# Patient Record
Sex: MALE | Race: WHITE | Employment: FULL TIME | URBAN - METROPOLITAN AREA
[De-identification: names, ages, dates, MRNs, and addresses within clinical notes are randomized per-mention and may not be internally consistent; named-entity substitution may affect disease eponyms.]

---

## 2018-12-31 ENCOUNTER — OFFICE VISIT (OUTPATIENT)
Dept: DERMATOLOGY | Facility: CLINIC | Age: 23
End: 2018-12-31
Payer: COMMERCIAL

## 2018-12-31 VITALS — SYSTOLIC BLOOD PRESSURE: 120 MMHG | OXYGEN SATURATION: 98 % | HEART RATE: 79 BPM | DIASTOLIC BLOOD PRESSURE: 81 MMHG

## 2018-12-31 DIAGNOSIS — L30.9 DERMATITIS: ICD-10-CM

## 2018-12-31 DIAGNOSIS — L29.9 LOCALIZED PRURITUS: ICD-10-CM

## 2018-12-31 DIAGNOSIS — L21.9 DERMATITIS, SEBORRHEIC: Primary | ICD-10-CM

## 2018-12-31 DIAGNOSIS — L81.0 POST-INFLAMMATORY HYPERPIGMENTATION: ICD-10-CM

## 2018-12-31 PROCEDURE — 99203 OFFICE O/P NEW LOW 30 MIN: CPT | Performed by: PHYSICIAN ASSISTANT

## 2018-12-31 RX ORDER — KETOCONAZOLE 20 MG/ML
SHAMPOO TOPICAL
Qty: 120 ML | Refills: 11 | Status: SHIPPED | OUTPATIENT
Start: 2018-12-31 | End: 2019-09-18

## 2018-12-31 RX ORDER — THYROID 30 MG/1
30 TABLET ORAL DAILY
COMMUNITY
Start: 2018-08-10 | End: 2019-08-10

## 2018-12-31 NOTE — PROGRESS NOTES
HPI:  Larry Self is a 23 year old male patient here today for rash on face and chest .  Patient states this has been present for a while.  Patient reports the following symptoms: itch and red .  Patient reports the following previous treatments: elidel and HC cream once a day as needed. Also has a circular rash on left hip area/abdomen treating with ketoconazole daily x 6 months with some improvement..  Patient reports the following modifying factors: none.  Associated symptoms: flaking of scalp.  Patient has no other skin complaints today.  Remainder of the HPI, Meds, PMH, Allergies, FH, and SH was reviewed in chart.      History reviewed. No pertinent past medical history.    History reviewed. No pertinent surgical history.     History reviewed. No pertinent family history.    Social History     Socioeconomic History     Marital status: Single     Spouse name: Not on file     Number of children: Not on file     Years of education: Not on file     Highest education level: Not on file   Social Needs     Financial resource strain: Not on file     Food insecurity - worry: Not on file     Food insecurity - inability: Not on file     Transportation needs - medical: Not on file     Transportation needs - non-medical: Not on file   Occupational History     Not on file   Tobacco Use     Smoking status: Never Smoker     Smokeless tobacco: Never Used   Substance and Sexual Activity     Alcohol use: Not on file     Drug use: Not on file     Sexual activity: Not on file   Other Topics Concern     Not on file   Social History Narrative     Not on file       Outpatient Encounter Medications as of 12/31/2018   Medication Sig Dispense Refill     ketoconazole (NIZORAL) 2 % external shampoo Wet affected area daily, apply shampoo and lather, let sit for 3-5 minutes and then rinse. 120 mL 11     thyroid (ARMOUR THYROID) 30 MG tablet Take 30 mg by mouth daily       No facility-administered encounter medications on file as of 12/31/2018.         Review Of Systems:  Skin: As above  Eyes: negative  Ears/Nose/Throat: negative  Respiratory: No shortness of breath, dyspnea on exertion, cough, or hemoptysis  Cardiovascular: negative  Gastrointestinal: negative  Genitourinary: negative  Musculoskeletal: negative  Neurologic: negative  Psychiatric: negative  Hematologic/Lymphatic/Immunologic: negative  Endocrine: negative      Objective:     /81   Pulse 79   SpO2 98%   Eyes: Conjunctivae/lids: Normal   ENT: Lips:  Normal  MSK: Normal  Cardiovascular: Peripheral edema none  Pulm: Breathing Normal  Neuro/Psych: Orientation: Normal; Mood/Affect: Normal, NAD, WDWN  Pt accompanied by: self  Following areas examined: face, scalp, chest, abdomen, hands  Barakat skin type:ii   Findings:  Moderate flaking of scalp. Yellow/light pink greasy patches on chest and nlf  Wd Light brown/pink smooth patch on left inferior abdomen  Assessment and Plan:  1) Seborrheic Dermatitis and localized pruritis  Chronic condition that cannot be cured, but it can be managed.     Scalp: Wash scalp daily with Nizoral x 2-4 weeks. Then begin rotating between prescription shampoo Nizoral and one over the counter shampoo  (examples: Head and shoulders, Selsen Blue, Ketoconazole, T-Sal, and/or T-gel.     Face and chest: wash daily with nizoral shampoo x 2-4 weeks. Apply ketoconazole topical x 2-4 weeks and either elidel or hydrocortisone 2x a day for 1-2 weeks. Tapering with improvement.    Side effects of topical steroids including but not limited to atrophy (skin thinning), striae (stretch marks) telangiectasias, steroid acne, and others. Do not apply to normal skin. Do not apply to discolored skin that does not have rash present.     2) PIH  nummular dermatitis vs psoriasis?  Apply elidel 2x a day for 2 weeks when flared. Tapering with improvement. If no improvement would recommend trying a stronger medication. Pt will call if he would like a stronger rx.     Follow up in 4-6  weeks.

## 2018-12-31 NOTE — PATIENT INSTRUCTIONS
Seborrheic Dermatitis  Chronic condition that cannot be cured, but it can be managed.     Scalp: Wash scalp daily with Nizoral x 2-4 weeks. Then begin rotating between prescription shampoo Nizoral and one over the counter shampoo  (examples: Head and shoulders, Selsen Blue, Ketoconazole, T-Sal, and/or T-gel.     Face and chest: wash daily with nizoral shampoo x 2-4 weeks. Apply ketoconazole topical x 2-4 weeks and either elidel or hydrocortisone 2x a day for 1-2 weeks. Tapering with improvement.     Dermatitis on left hip: apply elidel 2x a day for 2 weeks. Tapering with improvement. If no improvement would recommend trying a stronger medication.     Side effects of topical steroids including but not limited to atrophy (skin thinning), striae (stretch marks) telangiectasias, steroid acne, and others. Do not apply to normal skin. Do not apply to discolored skin that does not have rash present.     Follow up in 4-6 weeks.                    Proper skin care from [facility]:    -Eliminate harsh soaps as they strip the natural oils from the skin, often resulting in dry itchy skin ( i.e. Dial, Zest, [place] Spring)  -Use mild soaps such as Cetaphil or Dove Sensitive Skin in the shower. You do not need to use soap on arms, legs, and trunk every time you shower unless visibly soiled.   -Avoid hot or cold showers.  -After showering, lightly dry off and apply moisturizing within 2-3 minutes. This will help trap moisture in the skin.   -Aggressive use of a moisturizer at least 1-2 times a day to the entire body (including -Vanicream, Cetaphil, Aquaphor or Cerave) and moisturize hands after every washing.  -We recommend using moisturizers that come in a tub that needs to be scooped out, not a pump. This has more of an oil base. It will hold moisture in your skin much better than a water base moisturizer. The above recommended are non-pore clogging.    Wear a sunscreen with at least SPF 30 on your face, ears, neck and V  of the chest daily. Wear sunscreen on other areas of the body if those areas are exposed to the sun throughout the day. Sunscreens can contain physical and/or chemical blockers. Physical blockers are less likely to clog pores, these include zinc oxide and titanium dioxide. Reapply every two hour and after swimming. Sunscreen examples include Neutrogena, CeraVe, Blue Lizard, Elta MD and many others.    UV radiation  UVA radiation remains constant throughout the day and throughout the year. It is a longer wavelength than UVB and therefore penetrates deeper into the skin leading to immediate and delayed tanning, photoaging, and skin cancer. 70-80% of UVA and UVB radiation occurs between the hours of 10am-2pm.  UVB radiation  UVB radiation causes the most harmful effects and is more significant during the summer months. However, snow and ice can reflect UVB radiation leading to skin damage during the winter months as well. UVB radiation is responsible for tanning, burning, inflammation, delayed erythema (pinkness), pigmentation (brown spots), and skin cancer.   Just because you do not burn or are not developing a tan does not mean that you are not damaging your skin. A 15 minute drive to and from work for 30 years an lead to chronic sun damage of the skin. It is important to wear a broad spectrum (both UVA and UVB) sunscreen EVERY day with at least 30 SPF. Apply to face, ears, neck and v of the chest as this is where most of our sun exposure is. Reapply sunscreen every two hours if you plan on being outside.   Senthil Mcneal. Clinical Dermatology: A Color Guide to Diagnosis and Therapy. [employer], 2016.     .

## 2019-09-18 ENCOUNTER — OFFICE VISIT (OUTPATIENT)
Dept: DERMATOLOGY | Facility: CLINIC | Age: 24
End: 2019-09-18
Payer: COMMERCIAL

## 2019-09-18 VITALS — SYSTOLIC BLOOD PRESSURE: 125 MMHG | OXYGEN SATURATION: 99 % | DIASTOLIC BLOOD PRESSURE: 81 MMHG | HEART RATE: 60 BPM

## 2019-09-18 DIAGNOSIS — L21.9 DERMATITIS, SEBORRHEIC: Primary | ICD-10-CM

## 2019-09-18 PROCEDURE — 99213 OFFICE O/P EST LOW 20 MIN: CPT | Performed by: PHYSICIAN ASSISTANT

## 2019-09-18 RX ORDER — KETOCONAZOLE 20 MG/ML
SHAMPOO TOPICAL
Qty: 120 ML | Refills: 11 | Status: SHIPPED | OUTPATIENT
Start: 2019-09-18 | End: 2021-08-30

## 2019-09-18 RX ORDER — CICLOPIROX OLAMINE 7.7 MG/G
CREAM TOPICAL
Qty: 30 G | Refills: 11 | Status: SHIPPED | OUTPATIENT
Start: 2019-09-18 | End: 2021-08-30

## 2019-09-18 NOTE — PROGRESS NOTES
HPI:  Larry Self is a 24 year old male patient here today for follow up seb derm of scalp, face, and chest .  Patient states this has been present for a while on and off.  Patient reports the following symptoms: rash .  Patient reports the following previous treatments: ketoconazole shampoo, topical, and HC cream with great improvement.  Patient reports the following modifying factors: none.  Associated symptoms: none.  Patient has no other skin complaints today.  Remainder of the HPI, Meds, PMH, Allergies, FH, and SH was reviewed in chart.      History reviewed. No pertinent past medical history.    History reviewed. No pertinent surgical history.     History reviewed. No pertinent family history.    Social History     Socioeconomic History     Marital status: Single     Spouse name: Not on file     Number of children: Not on file     Years of education: Not on file     Highest education level: Not on file   Occupational History     Not on file   Social Needs     Financial resource strain: Not on file     Food insecurity:     Worry: Not on file     Inability: Not on file     Transportation needs:     Medical: Not on file     Non-medical: Not on file   Tobacco Use     Smoking status: Never Smoker     Smokeless tobacco: Never Used   Substance and Sexual Activity     Alcohol use: Not on file     Drug use: Not on file     Sexual activity: Not on file   Lifestyle     Physical activity:     Days per week: Not on file     Minutes per session: Not on file     Stress: Not on file   Relationships     Social connections:     Talks on phone: Not on file     Gets together: Not on file     Attends Jainism service: Not on file     Active member of club or organization: Not on file     Attends meetings of clubs or organizations: Not on file     Relationship status: Not on file     Intimate partner violence:     Fear of current or ex partner: Not on file     Emotionally abused: Not on file     Physically abused: Not on file      Forced sexual activity: Not on file   Other Topics Concern     Not on file   Social History Narrative     Not on file       Outpatient Encounter Medications as of 9/18/2019   Medication Sig Dispense Refill     ciclopirox (LOPROX) 0.77 % cream Apply to affected area on face BID x 4-6 weeks 30 g 11     ketoconazole (NIZORAL) 2 % external shampoo Wet affected area daily, apply shampoo and lather, let sit for 3-5 minutes and then rinse. 120 mL 11     thyroid (ARMOUR THYROID) 30 MG tablet Take 30 mg by mouth daily       [DISCONTINUED] ketoconazole (NIZORAL) 2 % external shampoo Wet affected area daily, apply shampoo and lather, let sit for 3-5 minutes and then rinse. 120 mL 11     No facility-administered encounter medications on file as of 9/18/2019.        Review Of Systems:  Skin: As above  Eyes: negative  Ears/Nose/Throat: negative  Respiratory: No shortness of breath, dyspnea on exertion, cough, or hemoptysis  Cardiovascular: negative  Gastrointestinal: negative  Genitourinary: negative  Musculoskeletal: negative  Neurologic: negative  Psychiatric: negative  Hematologic/Lymphatic/Immunologic: negative  Endocrine: negative      Objective:     /81   Pulse 60   SpO2 99%   Eyes: Conjunctivae/lids: Normal   ENT: Lips:  Normal  MSK: Normal  Cardiovascular: Peripheral edema none  Pulm: Breathing Normal  Neuro/Psych: Orientation: Normal; Mood/Affect: Normal, NAD, WDWN  Pt accompanied by: self  Following areas examined: face, neck, chest  Barakat skin type:ii   Findings:  Pink/organe scaly patch chest, mustache and eyebrow area  Assessment and Plan:  1) Seborrheic Dermatitis  Disc orals and topicals. Pt defers orals. Will call soon if he changes mind.   States he clears up with he uses rx.   Chronic condition that cannot be cured, but it can be managed.   Scalp  Nizoral : Wet affected area daily, apply shampoo and lather, let sit for 3-5 minutes and then rinse.     Face and chest    Nizoral: Wash Wet affected  area daily, apply shampoo and lather, let sit for 3-5 minutes and then rinse.   Apply ciclopirox to affected area on face 2x a day for 4-6 weeks. Tapering with improvement.   Follow up in yearly. Sooner if rash does not improve.

## 2019-09-18 NOTE — PATIENT INSTRUCTIONS
Proper skin care from [facility]:    -Eliminate harsh soaps as they strip the natural oils from the skin, often resulting in dry itchy skin ( i.e. Dial, Zest, [place] Spring)  -Use mild soaps such as Cetaphil or Dove Sensitive Skin in the shower. You do not need to use soap on arms, legs, and trunk every time you shower unless visibly soiled.   -Avoid hot or cold showers.  -After showering, lightly dry off and apply moisturizing within 2-3 minutes. This will help trap moisture in the skin.   -Aggressive use of a moisturizer at least 1-2 times a day to the entire body (including -Vanicream, Cetaphil, Aquaphor or Cerave) and moisturize hands after every washing.  -We recommend using moisturizers that come in a tub that needs to be scooped out, not a pump. This has more of an oil base. It will hold moisture in your skin much better than a water base moisturizer. The above recommended are non-pore clogging.      Wear a sunscreen with at least SPF 30 on your face, ears, neck and V of the chest daily. Wear sunscreen on other areas of the body if those areas are exposed to the sun throughout the day. Sunscreens can contain physical and/or chemical blockers. Physical blockers are less likely to clog pores, these include zinc oxide and titanium dioxide. Reapply every two hour and after swimming. Sunscreen examples include Neutrogena, CeraVe, Blue Lizard, Elta MD and many others.    UV radiation  UVA radiation remains constant throughout the day and throughout the year. It is a longer wavelength than UVB and therefore penetrates deeper into the skin leading to immediate and delayed tanning, photoaging, and skin cancer. 70-80% of UVA and UVB radiation occurs between the hours of 10am-2pm.  UVB radiation  UVB radiation causes the most harmful effects and is more significant during the summer months. However, snow and ice can reflect UVB radiation leading to skin damage during the winter months as well. UVB radiation is  responsible for tanning, burning, inflammation, delayed erythema (pinkness), pigmentation (brown spots), and skin cancer.     Seborrheic Dermatitis  Chronic condition that cannot be cured, but it can be managed.   Scalp  Nizoral : Wet affected area daily, apply shampoo and lather, let sit for 3-5 minutes and then rinse.     Face and chest    Nizoral: Wash Wet affected area daily, apply shampoo and lather, let sit for 3-5 minutes and then rinse.   Apply ciclopirox to affected area on face 2x a day for 4-6 weeks. Tapering with improvement.         Follow up in 4-6 weeks.

## 2021-04-25 ENCOUNTER — HEALTH MAINTENANCE LETTER (OUTPATIENT)
Age: 26
End: 2021-04-25

## 2021-08-30 ENCOUNTER — OFFICE VISIT (OUTPATIENT)
Dept: DERMATOLOGY | Facility: CLINIC | Age: 26
End: 2021-08-30

## 2021-08-30 VITALS — HEART RATE: 93 BPM | SYSTOLIC BLOOD PRESSURE: 128 MMHG | DIASTOLIC BLOOD PRESSURE: 69 MMHG

## 2021-08-30 DIAGNOSIS — D22.9 MULTIPLE BENIGN NEVI: Primary | ICD-10-CM

## 2021-08-30 DIAGNOSIS — L21.9 DERMATITIS, SEBORRHEIC: ICD-10-CM

## 2021-08-30 PROCEDURE — 99214 OFFICE O/P EST MOD 30 MIN: CPT | Performed by: PHYSICIAN ASSISTANT

## 2021-08-30 RX ORDER — HYDROCORTISONE VALERATE CREAM 2 MG/G
CREAM TOPICAL 2 TIMES DAILY
Qty: 45 G | Refills: 3 | Status: SHIPPED | OUTPATIENT
Start: 2021-08-30

## 2021-08-30 RX ORDER — CICLOPIROX OLAMINE 7.7 MG/G
CREAM TOPICAL
Qty: 30 G | Refills: 11 | Status: SHIPPED | OUTPATIENT
Start: 2021-08-30

## 2021-08-30 RX ORDER — KETOCONAZOLE 20 MG/ML
SHAMPOO TOPICAL
Qty: 120 ML | Refills: 11 | Status: SHIPPED | OUTPATIENT
Start: 2021-08-30

## 2021-08-30 NOTE — LETTER
8/30/2021         RE: Larry Self  [address] [address]        Dear Colleague,    Thank you for referring your patient, Larry Self, to the [facility]. Please see a copy of my visit note below.    HPI:  Larry Self is a 26 year old male patient here today for follow up seborrheic dermatitis of scalp, eyebrows, NLF, beard and chest .  Patient states this has been present for a while.  Patient reports the following symptoms: itch and rash .  Patient reports the following previous treatments: ciclopriox, ketoconazole wash with resolution. Uses HC 2% and elidel prn with improvement. Has tried ketoconazole cream with minimal change.  Patient reports the following modifying factors: none.  Associated symptoms: none.  Patient has no other skin complaints today.  Remainder of the HPI, Meds, PMH, Allergies, FH, and SH was reviewed in chart.      No past medical history on file.    No past surgical history on file.     No family history on file.    Social History     Socioeconomic History     Marital status: Single     Spouse name: Not on file     Number of children: Not on file     Years of education: Not on file     Highest education level: Not on file   Occupational History     Not on file   Tobacco Use     Smoking status: Never Smoker     Smokeless tobacco: Never Used   Substance and Sexual Activity     Alcohol use: Not on file     Drug use: Not on file     Sexual activity: Not on file   Other Topics Concern     Not on file   Social History Narrative     Not on file     Social Determinants of Health     Financial Resource Strain:      Difficulty of Paying Living Expenses:    Food Insecurity:      Worried About Running Out of Food in the Last Year:      Ran Out of Food in the Last Year:    Transportation Needs:      Lack of Transportation (Medical):      Lack of Transportation (Non-Medical):    Physical Activity:      Days of Exercise per Week:      Minutes of Exercise  per Session:    Stress:      Feeling of Stress :    Social Connections:      Frequency of Communication with Friends and Family:      Frequency of Social Gatherings with Friends and Family:      Attends Evangelical Services:      Active Member of Clubs or Organizations:      Attends Club or Organization Meetings:      Marital Status:    Intimate Partner Violence:      Fear of Current or Ex-Partner:      Emotionally Abused:      Physically Abused:      Sexually Abused:        Outpatient Encounter Medications as of 8/30/2021   Medication Sig Dispense Refill     ciclopirox (LOPROX) 0.77 % cream Apply to affected area on face BID x 4-6 weeks 30 g 11     ketoconazole (NIZORAL) 2 % external shampoo Wet affected area daily, apply shampoo and lather, let sit for 3-5 minutes and then rinse. 120 mL 11     thyroid (ARMOUR THYROID) 30 MG tablet Take 30 mg by mouth daily       No facility-administered encounter medications on file as of 8/30/2021.       Review Of Systems:  Skin: seb derm  Eyes: negative  Ears/Nose/Throat: negative  Respiratory: No shortness of breath, dyspnea on exertion, cough, or hemoptysis  Cardiovascular: negative  Gastrointestinal: negative  Genitourinary: negative  Musculoskeletal: negative  Neurologic: negative  Psychiatric: negative  Hematologic/Lymphatic/Immunologic: negative  Endocrine: negative      Objective:     /69   Pulse 93   Eyes: Conjunctivae/lids: Normal   ENT: Lips:  Normal  MSK: Normal  Cardiovascular: Peripheral edema none  Pulm: Breathing Normal  Neuro/Psych: Orientation: A/O x 3 Normal; Mood/Affect: Normal, NAD, WDWN  Pt accompanied by: self  Following areas examined: face, neck, scalp, right medial arm,  Barakat skin type:i   Findings:  Pink greasy patches on scalp, eyebrows, beard and mustache  Smooth brown/red macule on left frontal scalp  Well circumscribed macules with symmetric color distribution on right medial arm  Assessment and Plan:  1)benign nevi    Treatment of these  lesions would be purely cosmetic and not medically neccessary.  Lesion may recur and/or may not completely resolve. May need additional treatment.  Different removal options including excision, cryotherapy, cautery and /or laser.      2 )Seborrheic Dermatitis  Pt is clear when he uses topicals. Would like refills of nizoral shampoo, hc and ciclopirox  Chronic condition that cannot be cured, but it can be managed.   Scalp  Nizoral : Wet scalp, face and beard, apply shampoo and lather, let sit for 3-5 minutes and then rinse.   Face  Apply ciclopirox to affected area on face 2x a day for 4-6 weeks. Tapering with improvement.   Apply a thin layer of hydrocortisone to affected area on face 2x a day for 2 weeks. Tapering with improvement. restart wotj flares. Discussed side effects of topical steroids including but not limited to atrophy (skin thinning), striae (stretch marks) telangiectasias, steroid acne, and others. Do not apply to normal skin. Do not apply to discolored skin that does not have rash present. Educated patient on post inflammatory hyperpigmentation.     Follow up in yearly. Sooner if rash does not improve.  It was a pleasure speaking with Larry Self today.           Again, thank you for allowing me to participate in the care of your patient.        Sincerely,        Ginger Chau PA-C

## 2021-08-30 NOTE — PATIENT INSTRUCTIONS
Seborrheic dermatitis  [email or web address]  Chronic condition that cannot be cured, but it can be managed.   Scalp  Nizoral : Wet scalp, face and beard, apply shampoo and lather, let sit for 3-5 minutes and then rinse.   face  Apply ciclopirox to affected area on face 2x a day for 4-6 weeks. Tapering with improvement.   Apply a thin layer of hydrocortisone to affected area on face 2x a day for 2 weeks. Tapering with improvement. restart wotj flares. Discussed side effects of topical steroids including but not limited to atrophy (skin thinning), striae (stretch marks) telangiectasias, steroid acne, and others. Do not apply to normal skin. Do not apply to discolored skin that does not have rash present. Educated patient on post inflammatory hyperpigmentation.

## 2021-08-30 NOTE — PROGRESS NOTES
HPI:  Larry Self is a 26 year old male patient here today for follow up seborrheic dermatitis of scalp, eyebrows, NLF, beard and chest .  Patient states this has been present for a while.  Patient reports the following symptoms: itch and rash .  Patient reports the following previous treatments: ciclopriox, ketoconazole wash with resolution. Uses HC 2% and elidel prn with improvement. Has tried ketoconazole cream with minimal change.  Patient reports the following modifying factors: none.  Associated symptoms: none.  Patient has no other skin complaints today.  Remainder of the HPI, Meds, PMH, Allergies, FH, and SH was reviewed in chart.      No past medical history on file.    No past surgical history on file.     No family history on file.    Social History     Socioeconomic History     Marital status: Single     Spouse name: Not on file     Number of children: Not on file     Years of education: Not on file     Highest education level: Not on file   Occupational History     Not on file   Tobacco Use     Smoking status: Never Smoker     Smokeless tobacco: Never Used   Substance and Sexual Activity     Alcohol use: Not on file     Drug use: Not on file     Sexual activity: Not on file   Other Topics Concern     Not on file   Social History Narrative     Not on file     Social Determinants of Health     Financial Resource Strain:      Difficulty of Paying Living Expenses:    Food Insecurity:      Worried About Running Out of Food in the Last Year:      Ran Out of Food in the Last Year:    Transportation Needs:      Lack of Transportation (Medical):      Lack of Transportation (Non-Medical):    Physical Activity:      Days of Exercise per Week:      Minutes of Exercise per Session:    Stress:      Feeling of Stress :    Social Connections:      Frequency of Communication with Friends and Family:      Frequency of Social Gatherings with Friends and Family:      Attends [employer] Services:      Active Member of Clubs or  Organizations:      Attends Club or Organization Meetings:      Marital Status:    Intimate Partner Violence:      Fear of Current or Ex-Partner:      Emotionally Abused:      Physically Abused:      Sexually Abused:        Outpatient Encounter Medications as of 8/30/2021   Medication Sig Dispense Refill     ciclopirox (LOPROX) 0.77 % cream Apply to affected area on face BID x 4-6 weeks 30 g 11     ketoconazole (NIZORAL) 2 % external shampoo Wet affected area daily, apply shampoo and lather, let sit for 3-5 minutes and then rinse. 120 mL 11     thyroid (ARMOUR THYROID) 30 MG tablet Take 30 mg by mouth daily       No facility-administered encounter medications on file as of 8/30/2021.       Review Of Systems:  Skin: seb derm  Eyes: negative  Ears/Nose/Throat: negative  Respiratory: No shortness of breath, dyspnea on exertion, cough, or hemoptysis  Cardiovascular: negative  Gastrointestinal: negative  Genitourinary: negative  Musculoskeletal: negative  Neurologic: negative  Psychiatric: negative  Hematologic/Lymphatic/Immunologic: negative  Endocrine: negative      Objective:     /69   Pulse 93   Eyes: Conjunctivae/lids: Normal   ENT: Lips:  Normal  MSK: Normal  Cardiovascular: Peripheral edema none  Pulm: Breathing Normal  Neuro/Psych: Orientation: A/O x 3 Normal; Mood/Affect: Normal, NAD, WDWN  Pt accompanied by: self  Following areas examined: face, neck, scalp, right medial arm,  [place] skin type:i   Findings:  Pink greasy patches on scalp, eyebrows, beard and mustache  Smooth brown/red macule on left frontal scalp  Well circumscribed macules with symmetric color distribution on right medial arm  Assessment and Plan:  1)benign nevi    Treatment of these lesions would be purely cosmetic and not medically neccessary.  Lesion may recur and/or may not completely resolve. May need additional treatment.  Different removal options including excision, cryotherapy, cautery and /or laser.      2 )Seborrheic  Dermatitis  Pt is clear when he uses topicals. Would like refills of nizoral shampoo, hc and ciclopirox  Chronic condition that cannot be cured, but it can be managed.   Scalp  Nizoral : Wet scalp, face and beard, apply shampoo and lather, let sit for 3-5 minutes and then rinse.   Face  Apply ciclopirox to affected area on face 2x a day for 4-6 weeks. Tapering with improvement.   Apply a thin layer of hydrocortisone to affected area on face 2x a day for 2 weeks. Tapering with improvement. restart wotj flares. Discussed side effects of topical steroids including but not limited to atrophy (skin thinning), striae (stretch marks) telangiectasias, steroid acne, and others. Do not apply to normal skin. Do not apply to discolored skin that does not have rash present. Educated patient on post inflammatory hyperpigmentation.     Follow up in yearly. Sooner if rash does not improve.  It was a pleasure speaking with Larry Self today.

## 2021-10-10 ENCOUNTER — HEALTH MAINTENANCE LETTER (OUTPATIENT)
Age: 26
End: 2021-10-10

## 2022-05-21 ENCOUNTER — HEALTH MAINTENANCE LETTER (OUTPATIENT)
Age: 27
End: 2022-05-21

## 2022-09-18 ENCOUNTER — HEALTH MAINTENANCE LETTER (OUTPATIENT)
Age: 27
End: 2022-09-18

## 2023-06-04 ENCOUNTER — HEALTH MAINTENANCE LETTER (OUTPATIENT)
Age: 28
End: 2023-06-04

## 2024-07-14 ENCOUNTER — HEALTH MAINTENANCE LETTER (OUTPATIENT)
Age: 29
End: 2024-07-14